# Patient Record
Sex: MALE | Employment: FULL TIME | URBAN - METROPOLITAN AREA
[De-identification: names, ages, dates, MRNs, and addresses within clinical notes are randomized per-mention and may not be internally consistent; named-entity substitution may affect disease eponyms.]

---

## 2023-11-10 ENCOUNTER — OFFICE VISIT (OUTPATIENT)
Dept: URGENT CARE | Facility: URGENT CARE | Age: 23
End: 2023-11-10

## 2023-11-10 VITALS
WEIGHT: 153.6 LBS | RESPIRATION RATE: 20 BRPM | HEART RATE: 54 BPM | DIASTOLIC BLOOD PRESSURE: 90 MMHG | OXYGEN SATURATION: 99 % | TEMPERATURE: 98.1 F | SYSTOLIC BLOOD PRESSURE: 135 MMHG

## 2023-11-10 DIAGNOSIS — A59.9 TRICHOMONIASIS: ICD-10-CM

## 2023-11-10 DIAGNOSIS — R30.0 DYSURIA: Primary | ICD-10-CM

## 2023-11-10 LAB
ALBUMIN UR-MCNC: NEGATIVE MG/DL
APPEARANCE UR: CLEAR
BACTERIA #/AREA URNS HPF: ABNORMAL /HPF
BILIRUB UR QL STRIP: NEGATIVE
COLOR UR AUTO: YELLOW
GLUCOSE UR STRIP-MCNC: NEGATIVE MG/DL
HGB UR QL STRIP: NEGATIVE
KETONES UR STRIP-MCNC: NEGATIVE MG/DL
LEUKOCYTE ESTERASE UR QL STRIP: NEGATIVE
NITRATE UR QL: NEGATIVE
PH UR STRIP: 7.5 [PH] (ref 5–7)
RBC #/AREA URNS AUTO: ABNORMAL /HPF
SP GR UR STRIP: 1.01 (ref 1–1.03)
SQUAMOUS #/AREA URNS AUTO: ABNORMAL /LPF
UROBILINOGEN UR STRIP-ACNC: 0.2 E.U./DL
WBC #/AREA URNS AUTO: ABNORMAL /HPF

## 2023-11-10 PROCEDURE — 87491 CHLMYD TRACH DNA AMP PROBE: CPT | Performed by: FAMILY MEDICINE

## 2023-11-10 PROCEDURE — 87591 N.GONORRHOEAE DNA AMP PROB: CPT | Performed by: FAMILY MEDICINE

## 2023-11-10 PROCEDURE — 81001 URINALYSIS AUTO W/SCOPE: CPT | Performed by: FAMILY MEDICINE

## 2023-11-10 PROCEDURE — 99203 OFFICE O/P NEW LOW 30 MIN: CPT | Performed by: FAMILY MEDICINE

## 2023-11-10 RX ORDER — METRONIDAZOLE 500 MG/1
2000 TABLET ORAL ONCE
Qty: 4 TABLET | Refills: 0 | Status: SHIPPED | OUTPATIENT
Start: 2023-11-10 | End: 2023-11-10

## 2023-11-10 NOTE — PROGRESS NOTES
SUBJECTIVE: Kaylyn Laura Jr. is a 22 year old male presenting with a chief complaint of trichomonas exposure .  Onset of symptoms was day(s) ago.  Course of illness is no symptoms.      No past medical history on file.  No Known Allergies  Social History     Tobacco Use    Smoking status: Not on file    Smokeless tobacco: Not on file   Substance Use Topics    Alcohol use: Not on file       ROS:  SKIN: no rash  GI: no vomiting    OBJECTIVE:  BP (!) 135/90 (BP Location: Left arm, Patient Position: Sitting, Cuff Size: Adult Regular)   Pulse 54   Temp 98.1  F (36.7  C) (Oral)   Resp 20   Wt 69.7 kg (153 lb 9.6 oz)   SpO2 99% GENERAL APPEARANCE: healthy, alert and no distress  SKIN: no suspicious lesions or rashes      ICD-10-CM    1. Dysuria  R30.0 UA with Microscopic reflex to Culture - Clinic Collect     Chlamydia trachomatis PCR     Neisseria gonorrhoeae PCR     UA Microscopic with Reflex to Culture      2. Trichomoniasis  A59.9 metroNIDAZOLE (FLAGYL) 500 MG tablet        Declines serum STD testing including HIV and Syphilis   Fluids/Rest, f/u if worse/not any better

## 2023-11-11 LAB
C TRACH DNA SPEC QL NAA+PROBE: NEGATIVE
N GONORRHOEA DNA SPEC QL NAA+PROBE: NEGATIVE

## 2023-11-12 ENCOUNTER — OFFICE VISIT (OUTPATIENT)
Dept: URGENT CARE | Facility: URGENT CARE | Age: 23
End: 2023-11-12

## 2023-11-12 VITALS
OXYGEN SATURATION: 100 % | SYSTOLIC BLOOD PRESSURE: 120 MMHG | WEIGHT: 153 LBS | TEMPERATURE: 97.5 F | DIASTOLIC BLOOD PRESSURE: 82 MMHG | RESPIRATION RATE: 16 BRPM | HEART RATE: 63 BPM

## 2023-11-12 DIAGNOSIS — Z20.2 EXPOSURE TO TRICHOMONAS: ICD-10-CM

## 2023-11-12 DIAGNOSIS — Z20.2 EXPOSURE TO GONORRHEA: Primary | ICD-10-CM

## 2023-11-12 PROCEDURE — 99212 OFFICE O/P EST SF 10 MIN: CPT | Mod: 25 | Performed by: EMERGENCY MEDICINE

## 2023-11-12 PROCEDURE — 96372 THER/PROPH/DIAG INJ SC/IM: CPT | Performed by: EMERGENCY MEDICINE

## 2023-11-12 RX ORDER — METRONIDAZOLE 500 MG/1
500 TABLET ORAL 2 TIMES DAILY
Qty: 14 TABLET | Refills: 0 | Status: SHIPPED | OUTPATIENT
Start: 2023-11-12 | End: 2023-11-12

## 2023-11-12 RX ORDER — CEFTRIAXONE SODIUM 1 G
500 VIAL (EA) INJECTION ONCE
Status: COMPLETED | OUTPATIENT
Start: 2023-11-12 | End: 2023-11-12

## 2023-11-12 RX ADMIN — Medication 500 MG: at 16:29

## 2023-11-12 NOTE — PATIENT INSTRUCTIONS
Do not have sex for 1 week while finishing your antibiotics as you may spread STDs back and forth even while taking the medication. Do not drink alcohol while taking Flagyl or you could have a violent throwing up experience.

## 2023-11-12 NOTE — NURSING NOTE
Clinic Administered Medication Documentation        Patient was given Rocephin. Prior to medication administration, verified patient's identity using patient s name and date of birth. Please see MAR and medication order for additional information. Patient instructed to remain in clinic for 15 minutes and report any adverse reaction to staff immediately.    Vial/Syringe: Single dose vial. Was entire vial of medication used? Yes  Carol Kilpatrick LPN

## 2023-11-12 NOTE — PROGRESS NOTES
Assessment & Plan     Diagnosis:    ICD-10-CM    1. Exposure to gonorrhea  Z20.2 cefTRIAXone (ROCEPHIN) in lidocaine 1% (PF) for IM administration only 500 mg      2. Exposure to trichomonas  Z20.2 DISCONTINUED: metroNIDAZOLE (FLAGYL) 500 MG tablet        Medical Decision Making:  Kaylyn Laura Jr. is a 22 year old male who presented with concern for sexually transmitted infection. Patient is asymptomatic. Well appearing. Does not appear toxic/septic. Abd soft and non-tender.  Patient reports his partner tested positive for trichomonas and gonorrhea and requests treatment. He was tested two days ago and was negative, but she was positive and they have been intimate in the meantime. Patient treated empirically with Rocephin 500mg IM for gonorrhea; was given Flagyl for trichomonas two days ago but has not started this -- will now.  The patient was informed that we are not providing comprehensive STD testing or treatment and that he needs to follow up with a STD clinic or his primary care provider for complete testing.    Discussed importance of abstaining from sexual intercourse for 7 days to prevent re-transmission, notifying all recent partners in previous 60 days to obtain testing, and need for re-screening in 3 months.  Also discussed strict return precautions and need for outpatient  PMD follow up. Patient verbalizes understanding and agreement with the plan. All questions answered.       Eladio Medina PA-C  [facility]    Subjective     Kaylyn Laura Jr. is a 22 year old male who presents to clinic today for the following health issues:  Chief Complaint   Patient presents with    STD     Std testing-gf is positive        HPI    Patient reports that his partner tested positive for trichomonas and gonorrhea, he tested -2 days ago but they have been intimate.  He has not had any symptoms including testicular pain, penile pain, discharge, abdominal pain, fevers or other concerns. Would  like treatment.       Review of Systems    See HPI    Objective      Vitals: /82   Pulse 63   Temp 97.5  F (36.4  C) (Tympanic)   Resp 16   Wt 69.4 kg (153 lb)   SpO2 100%     Patient Vitals for the past 24 hrs:   BP Temp Temp src Pulse Resp SpO2 Weight   11/12/23 1530 120/82 97.5  F (36.4  C) Tympanic 63 16 100 % 69.4 kg (153 lb)       Vital signs reviewed by: Eladio Medina PA-C    Physical Exam   Constitutional: Patient is alert and cooperative. No acute distress.  Cardiovascular: Regular rate and rhythm  Pulmonary/Chest: Lungs are clear to auscultation throughout. Effort normal. No respiratory distress. No wheezes, rales or rhonchi.  GI: Abdomen is soft and non-tender throughout.   MSK: No CVA tenderness bilaterally.  Neurological: Alert and oriented x3.   Psychiatric:The patient has a normal mood and affect.     Interventions:  Rocephin 500mg IM    Eladio Medina PA-C, November 12, 2023

## 2023-12-31 ENCOUNTER — OFFICE VISIT (OUTPATIENT)
Dept: URGENT CARE | Facility: URGENT CARE | Age: 23
End: 2023-12-31

## 2023-12-31 VITALS
DIASTOLIC BLOOD PRESSURE: 56 MMHG | TEMPERATURE: 97.6 F | HEART RATE: 53 BPM | SYSTOLIC BLOOD PRESSURE: 109 MMHG | RESPIRATION RATE: 18 BRPM | WEIGHT: 158.6 LBS | OXYGEN SATURATION: 98 %

## 2023-12-31 DIAGNOSIS — S41.111S ARM LACERATION, RIGHT, SEQUELA: Primary | ICD-10-CM

## 2023-12-31 PROCEDURE — 99212 OFFICE O/P EST SF 10 MIN: CPT | Performed by: NURSE PRACTITIONER

## 2023-12-31 NOTE — PROGRESS NOTES
23-year-old male patient presents for suture removal after they were placed 14 days ago in the right arm at [facility].     No signs of infection, Wound has healed well.    Sutures removed without difficulty.    Follow-up prn.    Jeannine Jacques, CNP